# Patient Record
Sex: MALE | Race: WHITE
[De-identification: names, ages, dates, MRNs, and addresses within clinical notes are randomized per-mention and may not be internally consistent; named-entity substitution may affect disease eponyms.]

---

## 2021-08-23 ENCOUNTER — HOSPITAL ENCOUNTER (EMERGENCY)
Dept: HOSPITAL 4 - SED | Age: 31
LOS: 1 days | Discharge: HOME | End: 2021-08-24
Payer: MEDICAID

## 2021-08-23 VITALS — SYSTOLIC BLOOD PRESSURE: 143 MMHG

## 2021-08-23 VITALS — HEIGHT: 69 IN | BODY MASS INDEX: 23.25 KG/M2 | WEIGHT: 157 LBS

## 2021-08-23 DIAGNOSIS — M25.561: Primary | ICD-10-CM
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
## 2021-08-24 VITALS — SYSTOLIC BLOOD PRESSURE: 139 MMHG

## 2021-08-24 NOTE — NUR
Patient given written and verbal discharge instructions and verbalizes 
understanding.  ER MD discussed with patient the results and treatment 
provided. Patient in stable condition. ID arm band removed. 

no Rx of  given. Patient educated on pain management and to follow up with PMD. 
Pain Scale 5/10.

Opportunity for questions provided and answered. Medication side effect fact 
sheet provided.